# Patient Record
(demographics unavailable — no encounter records)

---

## 2025-01-22 NOTE — PROCEDURE
[FreeTextEntry1] : We began this IN PPERSON evaluation at 10:22 am and ended at 11:00 am with 5 minutes after pt left to complete documentation.   Work status: working full time Temp impairment: approx.15% Last exam: Oct 2, 2024  Ms. Mcpherson notes that she has been engaging in a lot of self-care and meditation which she has credited to helping bring her diastolic BP down which was measured today.  Her workplace is under new management and her workload has increased and her health insurance has changed so she has not yet been to the cardiologist, nevertheless her DBP was 86 mmHg today.  However, her work is now all sedentary and the new management has increased the workload and the breaks have diminished, hence her ability to take time to walk during the workday has decreased.  She has noticed her hair loss has not progressed, but her hair has not returned to pre-Covid fullness or texture.  She admits to new crops of acneiform lesions on her face in between her eyebrows.  She admits to numbness in her legs sometimes but much less than during her Covid illness; she has taken B12 supplements. We discussed her recovery and the prior traumatic incidents arising from her former employment at some length.   Physical two punctate papular lesions in between eyebrows Facial skin: macular discolorations as noted previously, no new lesions on face or chin BP: remeasured: seated, regular size cuff: L = 110/85, R = 110/80 P = 100 bpm   Assessment Improvement noted in BP measured today, though pulse is still elevated; diagnoses unchanged  Plan 1)  Continue self-management; advised more frequent walking 2)  Re-eval in 3 months.  I spent a total of 43 minutes today in the care of this patient.

## 2025-01-22 NOTE — PLAN
[FreeTextEntry1] : Refer to MD note.  [FreeTextEntry2] : n/a [FreeTextEntry3] : Improving [FreeTextEntry4] : 3 months

## 2025-01-22 NOTE — ASSESSMENT
[Indicate if, in your opinion, the incident that the patient described was the competent medical cause of this injury/illness.] : The incident that the patient described was the competent medical cause of this injury/illness: Yes [Indicate if the patient's complaints are consistent with his/her history of the injury/illness.] : Indicate if the patient's complaints are consistent with his/her history of the injury/illness: Yes [Yes] : Yes, it is consistent [Are there any work limitations? (If so, explain and quantify, including the anticipated duration of the limitations)] : There are work limitations. [Can the patient return to usual work activities as indicated? If yes, indicate date___] : The patient cannot return to usual work activities as indicated. [FreeTextEntry5] : approx 15 [FreeTextEntry6] : Refer to documents in file.  [FreeTextEntry7] : Refer to documents in file.

## 2025-04-23 NOTE — HISTORY OF PRESENT ILLNESS
[Has the patient missed work because of the injury/illness?] : The patient has missed work because of the injury/illness. [Yes] : The patient is currently working.

## 2025-04-24 NOTE — PROCEDURE
[FreeTextEntry1] : We began this TELEHEALTH evluation at 10:15 am and ended at 11 am.   Work status: working FT Temp impairment: 20 % Last exam: Jan 22, 2025  Ms. Mcpherson notes she has had 1 or 2 headaches in the past 2 months which she attributes to stress due to major changes in her workplace.  She has a step 3 hearing regarding her final work status regarding "termination" on May 14.  She has been in contact with her former employer since last July 2024 on this issue.  She is being asked to sign a document that she will never apply tfor work with [place] [employer] [facility] ([email or web address]).  She has been given a termination date of which she was not informed about.   We spoke about forgiveness of individuals and therafter pursuing justice administratively.  She verbalized gratitude for the support she received from counseling and an  and the support from our clinic.   Ms. Mcpherson also spoke about her journey through healing which includes acknowledging her feelings about the situtation as she was dealing with the physical symptoms including trouble sleeping and legs being uncomfortable awakening her as well such that she was unable to get decent sleep at night and also the administrative challenges with her workplace and workers compensation process and the lack of support from her labor union from which she expected support.  She still has some sleeping troubles and she spoke about needing to heal which comes at the aftermath of all of this.  She notes that freedom and happiness needs to be present after which real rest can take place.  When she feels stressed, she notess that her neck and shoulder tighten up.    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 Pt will send me the notice she has received regarding her termination which she will discuss w/an  3). Re-eval in 3 months  I personally spent a total of 45 minutes today in the care of this patient..

## 2025-04-24 NOTE — ASSESSMENT
[Indicate if, in your opinion, the incident that the patient described was the competent medical cause of this injury/illness.] : The incident that the patient described was the competent medical cause of this injury/illness: Yes [Indicate if the patient's complaints are consistent with his/her history of the injury/illness.] : Indicate if the patient's complaints are consistent with his/her history of the injury/illness: Yes [Yes] : Yes, it is consistent [Can the patient return to usual work activities as indicated? If yes, indicate date___] : The patient cannot return to usual work activities as indicated. [Are there any work limitations? (If so, explain and quantify, including the anticipated duration of the limitations)] : There are work limitations. [FreeTextEntry5] : 20 [FreeTextEntry6] : Refer to documents in file.  [FreeTextEntry7] : Refer to documents in file.

## 2025-04-24 NOTE — PLAN
[FreeTextEntry1] : Refer to MD note.  [FreeTextEntry2] : n/a [FreeTextEntry3] : Depends on outcome of administrative issues and interventions discussed.  [FreeTextEntry4] : 3 months.

## 2025-04-24 NOTE — PROCEDURE
[FreeTextEntry1] : We began this TELEHEALTH evluation at 10:15 am and ended at 11 am.   Work status: working FT Temp impairment: 20 % Last exam: Jan 22, 2025  Ms. Mcpherson notes she has had 1 or 2 headaches in the past 2 months which she attributes to stress due to major changes in her workplace.  She has a step 3 hearing regarding her final work status regarding "termination" on May 14.  She has been in contact with her former employer since last July 2024 on this issue.  She is being asked to sign a document that she will never apply tfor work with [place] [employer] [facility] institutions ([employer]).  She has been given a termination date of which she was not informed about.   We spoke about forgiveness of individuals and therafter pursuing justice administratively.  She verbalized gratitude for the support she received from counseling and an  and the support from our clinic.   Ms. Mcpherson also spoke about her journey through healing which includes acknowledging her feelings about the situtation as she was dealing with the physical symptoms including trouble sleeping and legs being uncomfortable awakening her as well such that she was unable to get decent sleep at night and also the administrative challenges with her workplace and workers compensation process and the lack of support from her labor union from which she expected support.  She still has some sleeping troubles and she spoke about needing to heal which comes at the aftermath of all of this.  She notes that freedom and happiness needs to be present after which real rest can take place.  When she feels stressed, she notess that her neck and shoulder tighten up.    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 Pt will send me the notice she has received regarding her termination which she will discuss w/an  3). Re-eval in 3 months  I personally spent a total of 45 minutes today in the care of this patient..

## 2025-07-16 NOTE — PROCEDURE
[FreeTextEntry1] : We began this TELEHEALTH evaluation at 10:04 am and ended at 10:46 am.   Work status: working, FT Temp impairment: approx. 20% Last exam:  April 23, 2025  Ms Mcpherson reports that she has been developing skin sensitivity to the sun such that if she is in the sun for 10 or 15 minutes her skin wll turn dark and itchy and burning with a slightly elevated feel to it; she notes it is "feeling inflamed" ad it calms down with aloe vera gel and ice; the skin will look rough.  She is still developing acne on her face.    She still experiences numbness in her feet if she crosses her legs for an extended time period.  She is also complaining of numbness of her hands especially at night and she has to shake her hands to "wake them up".  She mentioned this to her PCP and he suggested carpal tunnel syndrome because her current job involves a lot of computer work; it's more on the L hand.  She is working an extra ten hours per week recently and she described her desk and keyboard which is not ergoomic. She is using an old laptop and I advised a separate keyboard with a keyboard tray.  She is working from home mostly.  She did have stress headaches but she attributes these to changes in her work and her health care benefits and possibly to beginning menopause.  The pt notes her BP was 130/90 mmHg at her PCP exam in April.  She notes that she is becoming more forgetful and is having to write down things frequently.   As regards her former work, she has asked that her status be changed to resignation instead of a termination which was done by her former employer without letting her know.  There was supposed to be a "Step 3" hearing with her former employer and her union but the pt just sent a resignation letter and no longer wants to continue these "hearings" with the former employer and the union which has not been helpful she notes.  The hospital (her former employer) states it tried to accommodate her but she did not accept it.  The resignation status that was offered her stipulated that she no longer would be allowed to work for a [place] hospital or clinic but she did not want to agree to that.  She also did not receive payment for 6 months when she was out of work due to the lack of accommodation.  She notes her former employer did not offer her care management positions which did exist.  She has decided she needs to take care of herself and her family and we discussed being a better care giver despite the losses she experienced and learning to encourage others instead of complaining.    Physical DAIANA: acne pustular on the upper L side of the nose and two acne form lesions on the forehead which are new pt notes L arm, seated: 142/96 mmHg, P=67  R arm, seated:  130/91 mmHg, P=61  Assessment Symptomatic; diagnoses unchanged  Plan 1).  Advised pt to ask her PCP about Mg supplementation and also to add epsom salts in her bath 2).  Advised ergo evaluation and gave referral suggestion 3). Advised taking a 5 min break w/specific relaxation maneuvers every 25 minutes being seated and to use a timer 4). Re-eval in 3 months.  I personally spent a total of 40 minutes today in the care of this patient.

## 2025-07-16 NOTE — ASSESSMENT
[Indicate if, in your opinion, the incident that the patient described was the competent medical cause of this injury/illness.] : The incident that the patient described was the competent medical cause of this injury/illness: Yes [Indicate if the patient's complaints are consistent with his/her history of the injury/illness.] : Indicate if the patient's complaints are consistent with his/her history of the injury/illness: Yes [Yes] : Yes, it is consistent [Can the patient return to usual work activities as indicated? If yes, indicate date___] : The patient cannot return to usual work activities as indicated. [Are there any work limitations? (If so, explain and quantify, including the anticipated duration of the limitations)] : There are work limitations. [FreeTextEntry5] : approx 20 [FreeTextEntry6] : Refer to documents in file.  [FreeTextEntry7] : Refer to letter in file.

## 2025-07-16 NOTE — HISTORY OF PRESENT ILLNESS
[Has the patient missed work because of the injury/illness?] : The patient has missed work because of the injury/illness. [Yes] : The patient is currently working. [Resumed full work: ______] : The patient resumed full work on [unfilled].

## 2025-07-16 NOTE — PLAN
[FreeTextEntry1] : Refer to MD note.  [FreeTextEntry2] : n/a [FreeTextEntry3] : Unknown [FreeTextEntry4] : 3 months